# Patient Record
Sex: MALE | HISPANIC OR LATINO | Employment: FULL TIME | URBAN - METROPOLITAN AREA
[De-identification: names, ages, dates, MRNs, and addresses within clinical notes are randomized per-mention and may not be internally consistent; named-entity substitution may affect disease eponyms.]

---

## 2020-05-13 ENCOUNTER — OFFICE VISIT (OUTPATIENT)
Dept: FAMILY MEDICINE CLINIC | Facility: CLINIC | Age: 30
End: 2020-05-13
Payer: COMMERCIAL

## 2020-05-13 VITALS
BODY MASS INDEX: 37.05 KG/M2 | OXYGEN SATURATION: 98 % | TEMPERATURE: 97.8 F | WEIGHT: 217 LBS | HEART RATE: 71 BPM | RESPIRATION RATE: 16 BRPM | DIASTOLIC BLOOD PRESSURE: 78 MMHG | SYSTOLIC BLOOD PRESSURE: 124 MMHG | HEIGHT: 64 IN

## 2020-05-13 DIAGNOSIS — T17.310A CHOKING DUE TO PHLEGM IN LARYNX, INITIAL ENCOUNTER: Primary | ICD-10-CM

## 2020-05-13 DIAGNOSIS — F41.8 ANXIETY ABOUT HEALTH: ICD-10-CM

## 2020-05-13 PROCEDURE — 1036F TOBACCO NON-USER: CPT | Performed by: FAMILY MEDICINE

## 2020-05-13 PROCEDURE — 3008F BODY MASS INDEX DOCD: CPT | Performed by: FAMILY MEDICINE

## 2020-05-13 PROCEDURE — 99204 OFFICE O/P NEW MOD 45 MIN: CPT | Performed by: FAMILY MEDICINE

## 2023-03-11 ENCOUNTER — HOSPITAL ENCOUNTER (EMERGENCY)
Facility: HOSPITAL | Age: 33
Discharge: HOME | End: 2023-03-12
Attending: EMERGENCY MEDICINE | Admitting: EMERGENCY MEDICINE

## 2023-03-11 DIAGNOSIS — T23.209A: ICD-10-CM

## 2023-03-11 DIAGNOSIS — T23.229A: ICD-10-CM

## 2023-03-11 DIAGNOSIS — T23.201A PARTIAL THICKNESS BURN OF MULTIPLE SITES OF RIGHT HAND, INITIAL ENCOUNTER: Primary | ICD-10-CM

## 2023-03-11 RX ORDER — IBUPROFEN 600 MG/1
600 TABLET ORAL EVERY 6 HOURS PRN
Qty: 30 TABLET | Refills: 0 | Status: SHIPPED | OUTPATIENT
Start: 2023-03-11

## 2023-03-11 RX ORDER — GINSENG 100 MG
1 CAPSULE ORAL 2 TIMES DAILY
Qty: 28 G | Refills: 0 | Status: SHIPPED | OUTPATIENT
Start: 2023-03-11

## 2023-03-11 RX ORDER — GINSENG 100 MG
1 CAPSULE ORAL ONCE
Status: DISCONTINUED | OUTPATIENT
Start: 2023-03-11 | End: 2023-03-11

## 2023-03-11 RX ORDER — MORPHINE SULFATE 10 MG/ML
6 INJECTION, SOLUTION INTRAMUSCULAR; INTRAVENOUS ONCE
Status: COMPLETED | OUTPATIENT
Start: 2023-03-11 | End: 2023-03-11

## 2023-03-11 RX ORDER — MORPHINE SULFATE 4 MG/ML
4 INJECTION, SOLUTION INTRAMUSCULAR; INTRAVENOUS ONCE
Status: DISCONTINUED | OUTPATIENT
Start: 2023-03-11 | End: 2023-03-12

## 2023-03-11 RX ORDER — IBUPROFEN 600 MG/1
600 TABLET ORAL ONCE
Status: COMPLETED | OUTPATIENT
Start: 2023-03-11 | End: 2023-03-12

## 2023-03-11 RX ORDER — GINSENG 100 MG
1 CAPSULE ORAL ONCE
Status: COMPLETED | OUTPATIENT
Start: 2023-03-11 | End: 2023-03-11

## 2023-03-11 RX ORDER — OXYCODONE HYDROCHLORIDE AND ACETAMINOPHEN 5; 325 MG/1; MG/1
1 TABLET ORAL EVERY 6 HOURS PRN
Qty: 12 TABLET | Refills: 0 | Status: SHIPPED | OUTPATIENT
Start: 2023-03-11 | End: 2023-03-21

## 2023-03-11 RX ADMIN — BACITRACIN 1 LARGE APPLICATION: 500 OINTMENT TOPICAL at 23:40

## 2023-03-11 RX ADMIN — MORPHINE SULFATE 6 MG: 10 INJECTION INTRAVENOUS at 23:33

## 2023-03-11 RX ADMIN — MORPHINE SULFATE 6 MG: 10 INJECTION INTRAVENOUS at 22:29

## 2023-03-12 VITALS
WEIGHT: 217 LBS | RESPIRATION RATE: 20 BRPM | TEMPERATURE: 98.2 F | HEART RATE: 88 BPM | HEIGHT: 64 IN | BODY MASS INDEX: 37.05 KG/M2 | DIASTOLIC BLOOD PRESSURE: 89 MMHG | OXYGEN SATURATION: 99 % | SYSTOLIC BLOOD PRESSURE: 149 MMHG

## 2023-03-12 RX ORDER — MORPHINE SULFATE 4 MG/ML
4 INJECTION, SOLUTION INTRAMUSCULAR; INTRAVENOUS ONCE
Status: COMPLETED | OUTPATIENT
Start: 2023-03-12 | End: 2023-03-12

## 2023-03-12 RX ADMIN — IBUPROFEN 600 MG: 600 TABLET, FILM COATED ORAL at 00:09

## 2023-03-12 RX ADMIN — MORPHINE SULFATE 4 MG: 4 INJECTION INTRAVENOUS at 00:22
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
## 2023-06-06 ENCOUNTER — APPOINTMENT (EMERGENCY)
Dept: RADIOLOGY | Facility: HOSPITAL | Age: 33
End: 2023-06-06
Payer: COMMERCIAL

## 2023-06-06 ENCOUNTER — HOSPITAL ENCOUNTER (EMERGENCY)
Facility: HOSPITAL | Age: 33
Discharge: HOME | End: 2023-06-06
Attending: EMERGENCY MEDICINE | Admitting: EMERGENCY MEDICINE
Payer: COMMERCIAL

## 2023-06-06 VITALS
DIASTOLIC BLOOD PRESSURE: 89 MMHG | SYSTOLIC BLOOD PRESSURE: 134 MMHG | TEMPERATURE: 99.3 F | OXYGEN SATURATION: 100 % | HEART RATE: 82 BPM | RESPIRATION RATE: 18 BRPM

## 2023-06-06 DIAGNOSIS — S61.219A FINGER LACERATION: Primary | ICD-10-CM

## 2023-06-06 DIAGNOSIS — S62.639A CLOSED FRACTURE OF TUFT OF DISTAL PHALANX OF FINGER: ICD-10-CM

## 2023-06-06 PROCEDURE — 73130 X-RAY EXAM OF HAND: CPT

## 2023-06-06 RX ORDER — CEPHALEXIN 500 MG/1
500 CAPSULE ORAL EVERY 8 HOURS SCHEDULED
Qty: 21 CAPSULE | Refills: 0 | Status: SHIPPED | OUTPATIENT
Start: 2023-06-06 | End: 2023-06-13

## 2023-06-06 RX ORDER — ROPIVACAINE HYDROCHLORIDE 5 MG/ML
20 INJECTION, SOLUTION EPIDURAL; INFILTRATION; PERINEURAL ONCE
Status: COMPLETED | OUTPATIENT
Start: 2023-06-06 | End: 2023-06-06

## 2023-06-06 RX ORDER — GINSENG 100 MG
1 CAPSULE ORAL ONCE
Status: COMPLETED | OUTPATIENT
Start: 2023-06-06 | End: 2023-06-06

## 2023-06-06 RX ORDER — CEPHALEXIN 250 MG/1
500 CAPSULE ORAL ONCE
Status: COMPLETED | OUTPATIENT
Start: 2023-06-06 | End: 2023-06-06

## 2023-06-06 RX ORDER — GINSENG 100 MG
1 CAPSULE ORAL 2 TIMES DAILY
Qty: 28 G | Refills: 0 | Status: SHIPPED | OUTPATIENT
Start: 2023-06-06

## 2023-06-06 RX ADMIN — ROPIVACAINE HYDROCHLORIDE 20 ML: 5 INJECTION, SOLUTION EPIDURAL; INFILTRATION; PERINEURAL at 18:53

## 2023-06-06 RX ADMIN — CEPHALEXIN 500 MG: 250 CAPSULE ORAL at 20:47

## 2023-06-06 RX ADMIN — BACITRACIN 1 LARGE APPLICATION: 500 OINTMENT TOPICAL at 20:47

## 2023-06-06 NOTE — ED PROVIDER NOTES
History  Chief Complaint   Patient presents with   • Laceration     Pt presents w/ laceration to left hand in multiple digits, unable to visualize lacerations in triage d/t clots  Right-handed 60-year-old male up-to-date on immunizations including tetanus presents to the emergency department with laceration to the left hand  Patient was working with a reciprocating saw  The piece of wood that he was cutting when out of the way  Patient then across to his left index and long finger  Laceration is not through the joint  Patient is able to flex and extend his finger as normal though there is exposed muscle on the flexural side of the finger  Patient notes normal sensation throughout the left hand  History provided by:  Spouse   used: Yes    Finger Laceration  Location:  Finger  Finger laceration location:  L index finger and L middle finger  Depth: Through muscle  Quality: avulsion and straight    Bleeding: venous    Injury mechanism: Saw  Pain details:     Quality:  Aching    Severity:  Severe    Timing:  Constant    Progression:  Unchanged  Foreign body present:  Thao Odom by:  Nothing  Worsened by:  Nothing  Ineffective treatments:  None tried  Tetanus status:  Up to date  Associated symptoms: no numbness                Prior to Admission Medications   Prescriptions Last Dose Informant Patient Reported? Taking?   bacitracin topical ointment 500 units/g topical ointment   No No   Sig: Apply 1 large application topically 2 (two) times a day   ibuprofen (MOTRIN) 600 mg tablet   No No   Sig: Take 1 tablet (600 mg total) by mouth every 6 (six) hours as needed for moderate pain or mild pain      Facility-Administered Medications: None       History reviewed  No pertinent past medical history      Past Surgical History:   Procedure Laterality Date   • FACIAL LACERATIONS REPAIR     • HAND SURGERY  2015       Family History   Problem Relation Age of Onset   • Diabetes Mother • Diabetes Father      I have reviewed and agree with the history as documented  E-Cigarette/Vaping     E-Cigarette/Vaping Substances     Social History     Tobacco Use   • Smoking status: Former     Types: Cigarettes     Start date: 5/13/2011     Quit date: 5/13/2013     Years since quitting: 10 0   • Smokeless tobacco: Never   Substance Use Topics   • Alcohol use: Yes   • Drug use: Never       Review of Systems   Skin: Positive for wound  All other systems reviewed and are negative  Physical Exam  Physical Exam  Vitals and nursing note reviewed  Constitutional:       General: He is not in acute distress  Appearance: He is well-developed  He is not diaphoretic  HENT:      Head: Normocephalic and atraumatic  Right Ear: External ear normal       Left Ear: External ear normal    Eyes:      Conjunctiva/sclera: Conjunctivae normal    Neck:      Trachea: No tracheal deviation  Cardiovascular:      Rate and Rhythm: Normal rate and regular rhythm  Heart sounds: Normal heart sounds  No murmur heard  Pulmonary:      Effort: No respiratory distress  Breath sounds: Normal breath sounds  No stridor  No wheezing or rales  Abdominal:      General: Bowel sounds are normal  There is no distension  Palpations: Abdomen is soft  There is no mass  Tenderness: There is no abdominal tenderness  There is no guarding or rebound  Musculoskeletal:         General: Tenderness, deformity and signs of injury present  Comments: 2 lacerations on the left index finger  Total laceration length approximately 7 cm to the left index finger  Distal to the DIP  No involvement of the joint space  3 cm laceration on the long finger of the left hand on the flexural portion  Does not go into the joint  Patient is able to flex and extend his left index finger and long finger at all joints  He has normal sensation to light touch throughout the left hand  Skin:     General: Skin is dry  Findings: No rash  Neurological:      Motor: No abnormal muscle tone  Coordination: Coordination normal    Psychiatric:         Behavior: Behavior normal          Thought Content: Thought content normal          Judgment: Judgment normal          Vital Signs  ED Triage Vitals   Temperature Pulse Respirations Blood Pressure SpO2   06/06/23 1836 06/06/23 1835 06/06/23 1835 06/06/23 1836 06/06/23 1835   99 3 °F (37 4 °C) 82 18 134/89 100 %      Temp Source Heart Rate Source Patient Position - Orthostatic VS BP Location FiO2 (%)   06/06/23 1836 06/06/23 1835 06/06/23 1836 06/06/23 1836 --   Oral Monitor Lying Right arm       Pain Score       --                  Vitals:    06/06/23 1835 06/06/23 1836   BP:  134/89   Pulse: 82    Patient Position - Orthostatic VS:  Lying         Visual Acuity      ED Medications  Medications   ropivacaine (NAROPIN) 0 5 % injection 20 mL (20 mL Infiltration Given 6/6/23 1853)   bacitracin topical ointment 1 large application (1 large application Topical Given 6/6/23 2047)   cephalexin (KEFLEX) capsule 500 mg (500 mg Oral Given 6/6/23 2047)       Diagnostic Studies  Results Reviewed     None                 XR hand 3+ views LEFT   ED Interpretation by Huma Low DO (06/06 1937)   Distal index Tuft fracture                 Procedures  Laceration repair    Date/Time: 6/6/2023 8:04 PM    Performed by: Huma Low DO  Authorized by: Huma Low DO  Consent: The procedure was performed in an emergent situation  Consent given by: patient  Patient identity confirmed: verbally with patient  Location: left hand  Laceration length: 10 cm  Contamination: The wound is contaminated  Foreign bodies: wood  Tendon involvement: none  Nerve involvement: none  Vascular damage: yes  Anesthesia: digital block    Anesthesia:  Local anesthetic: ropivicaine 0 5    Anesthetic total: 8 mL    Sedation:  Patient sedated: no      Wound Dehiscence:  Superficial Wound Dehiscence: simple closure      Procedure Details:  Irrigation solution: saline  Irrigation method: syringe  Amount of cleaning: extensive  Debridement: moderate  Degree of undermining: minimal  Skin closure: 5-0 nylon  Number of sutures: 18 (15 left index, 3 left long finger)  Technique: simple  Approximation: close  Approximation difficulty: complex  Dressing: antibiotic ointment, splint and non-adhesive packing strip  Patient tolerance: patient tolerated the procedure well with no immediate complications  Comments: Placed in a splint to the left index finger by nursing  Cleaning details: Metaline             ED Course                               SBIRT 22yo+    Flowsheet Row Most Recent Value   Initial Alcohol Screen: US AUDIT-C     1  How often do you have a drink containing alcohol? 0 Filed at: 06/06/2023 1836   2  How many drinks containing alcohol do you have on a typical day you are drinking? 0 Filed at: 06/06/2023 1836   3a  Male UNDER 65: How often do you have five or more drinks on one occasion? 0 Filed at: 06/06/2023 1836   Audit-C Score 0 Filed at: 06/06/2023 1836   MARK: How many times in the past year have you    Used an illegal drug or used a prescription medication for non-medical reasons? Never Filed at: 06/06/2023 1836                    Medical Decision Making  Patient with laceration to the left index finger and long finger  Copiously irrigated with normal saline  Small amounts of wood pulled from the wound  Laceration was closed with 15 sutures to the left index finger and 3 to the left long finger  Performed evaluate for fracture  Patient has a distal phalanx fracture  Complex laceration  Recommend follow-up with hand surgery  Strict return precautions discussed for signs of infection which I went over with the patient and wife  We will start on cephalexin      Closed fracture of tuft of distal phalanx of finger: acute illness or injury  Finger laceration: acute illness or injury  Amount and/or Complexity of Data Reviewed  Radiology: ordered and independent interpretation performed  Risk  OTC drugs  Prescription drug management  Disposition  Final diagnoses:   Finger laceration   Closed fracture of tuft of distal phalanx of finger     Time reflects when diagnosis was documented in both MDM as applicable and the Disposition within this note     Time User Action Codes Description Comment    6/6/2023  8:03 PM Kendrick Sims Add [E34 045B] Finger laceration     6/6/2023  8:26 PM Balwinder Ibarra Add [S60 049A] Closed fracture of tuft of distal phalanx of finger       ED Disposition     ED Disposition   Discharge    Condition   Stable    Date/Time   Tue Jun 6, 2023  8:26 PM    Comment   Branden Chairez discharge to home/self care                 Follow-up Information     Follow up With Specialties Details Why Contact Info Additional Information    Luis Tanner MD Orthopedic Surgery, Hand Surgery Schedule an appointment as soon as possible for a visit  For re-evaluation as soon as possible [address] [facility] Emergency Department Emergency Medicine  If symptoms worsen [address] [facility],[address] [id number]  [address] [place] [address] Emergency Department, [address], [address] [place] [address] Rd          Discharge Medication List as of 6/6/2023  8:29 PM      START taking these medications    Details   cephalexin (KEFLEX) 500 mg capsule Take 1 capsule (500 mg total) by mouth every 8 (eight) hours for 7 days, Starting Tue 6/6/2023, Until Tue 6/13/2023, Normal         CONTINUE these medications which have CHANGED    Details   bacitracin topical ointment 500 units/g topical ointment Apply 1 large application topically 2 (two) times a day, Starting Tue 6/6/2023, Normal         CONTINUE these medications which have NOT CHANGED    Details   ibuprofen (MOTRIN) 600 mg tablet Take 1 tablet (600 mg total) by mouth every 6 (six) hours as needed for moderate pain or mild pain, Starting Sat 3/11/2023, Normal             No discharge procedures on file      PDMP Review     None          ED Provider  Electronically Signed by           Alka Beaver,   06/06/23 Elsi [id number],   06/06/23 6095

## 2023-06-06 NOTE — ED NOTES
Provider at bedside     [facility], [address] [place] [address]  06/06/23 9405

## 2023-06-07 NOTE — TELEPHONE ENCOUNTER
Caller:  Wife    Doctor: Hanna Hudson     Reason for call: Calling to scheduled ED f/u for Closed fracture of tuft of distal phalanx of finger      Call back#: [phone]

## 2023-06-08 VITALS — SYSTOLIC BLOOD PRESSURE: 108 MMHG | DIASTOLIC BLOOD PRESSURE: 60 MMHG | HEART RATE: 70 BPM

## 2023-06-08 DIAGNOSIS — S62.639A CLOSED FRACTURE OF TUFT OF DISTAL PHALANX OF FINGER: Primary | ICD-10-CM

## 2023-06-08 DIAGNOSIS — S61.213A LACERATION OF LEFT MIDDLE FINGER WITHOUT FOREIGN BODY WITHOUT DAMAGE TO NAIL, INITIAL ENCOUNTER: ICD-10-CM

## 2023-06-08 DIAGNOSIS — S61.211A LACERATION OF LEFT INDEX FINGER WITHOUT FOREIGN BODY, NAIL DAMAGE STATUS UNSPECIFIED, INITIAL ENCOUNTER: ICD-10-CM

## 2023-06-08 PROCEDURE — 99204 OFFICE O/P NEW MOD 45 MIN: CPT | Performed by: SURGERY

## 2023-06-08 RX ORDER — SENNOSIDES 8.6 MG
650 CAPSULE ORAL EVERY 8 HOURS PRN
Qty: 30 TABLET | Refills: 0 | Status: SHIPPED | OUTPATIENT
Start: 2023-06-08

## 2023-06-08 RX ORDER — IBUPROFEN 600 MG/1
TABLET ORAL EVERY 6 HOURS PRN
Qty: 30 TABLET | Refills: 0 | Status: CANCELLED | OUTPATIENT
Start: 2023-06-08

## 2023-06-08 RX ORDER — IBUPROFEN 600 MG/1
600 TABLET ORAL EVERY 6 HOURS PRN
Qty: 30 TABLET | Refills: 0 | Status: SHIPPED | OUTPATIENT
Start: 2023-06-08

## 2023-06-08 NOTE — PROGRESS NOTES
Red STUART  Attending, Orthopaedic Surgery  Hand, Wrist, and Elbow Surgery  [facility]      ORTHOPAEDIC HAND, WRIST, AND ELBOW OFFICE  VISIT       ASSESSMENT/PLAN:      28 y o  male with left index finger tuft fracture, left index and long finger lacerations due to saw injury, DOI 6/6/23    X-rays were reviewed  The etiology of above diagnosis was discussed along with treatment options  It is likely his fracture will not heal bone to bone but should scar in an be asymptomatic  He will start dressing changes 2x a day  The dressings will consist of adaptic, finger socks and co-ban  He may leave the wounds open to air to allow wounds to dry out prior to new dressing application  He may wash his hands normally and perform normal hand hygiene  He was advised to avoid standing water  He was also advised to avoid the use of ointment over the wounds  Continue oral ABX as prescribed  He will start to work on finger flexion at home  650 MG of Tylenol and 600 MG of Ibuprofen was prescribed and sent to his pharmacy electronically, to be taken up to 3x a day for pain control  Follow up in 7-8 days for suture removal      Plan of care was provided in S     [employer] interpretor [id number] was used throughout the entirety of this visit     The patient verbalized understanding of exam findings and treatment plan  We engaged in the shared decision-making process and treatment options were discussed at length with the patient  Surgical and conservative management discussed today along with risks and benefits  Diagnoses and all orders for this visit:    Closed fracture of tuft of distal phalanx of finger    Laceration of left index finger without foreign body, nail damage status unspecified, initial encounter    Laceration of left middle finger without foreign body without damage to nail, initial encounter      Follow Up:  Return in about 1 week (around 6/15/2023)      To Do Next Visit:  Re-evaluation of current issue and Sutures out    ____________________________________________________________________________________________________________________________________________      CHIEF COMPLAINT:  Chief Complaint   Patient presents with   • Left Hand - Pain     Finger laceration on left pointer and long finger  SUBJECTIVE:  Napoleon Parham is a 28y o  year old RHD male who presents to the office for evaluation of his left hand  He states on 6/6/23 he injured his left index and long finger on a saw when cutting wood at home  He presented to the ED after injury, at which time x-rays were performed, his lacerations were cleaned and sutured  He was placed on oral ABX at that time  He was also placed into an aluminum finger splint for the index finger  He is taking Keflex as prescribed  He notes pain mainly to his index finger  He does note numbness to his index and long fingers, more so to the index finger  He underwent a Tetanus shot 7 years ago  Pain/symptom timing:  Worse during the day when active  Pain/symptom context:  Worse with activites and work  Pain/symptom modifying factors:  Rest makes better, activities make worse  Pain/symptom associated signs/symptoms: none    Prior treatment   · NSAIDsYes   · Injections No   · Bracing/Orthotics Yes    Physical Therapy No     I have personally reviewed all the relevant PMH, PSH, SH, FH, Medications and allergies      PAST MEDICAL HISTORY:  History reviewed  No pertinent past medical history      PAST SURGICAL HISTORY:  Past Surgical History:   Procedure Laterality Date   • FACIAL LACERATIONS REPAIR     • HAND SURGERY  2015       FAMILY HISTORY:  Family History   Problem Relation Age of Onset   • Diabetes Mother    • Diabetes Father        SOCIAL HISTORY:  Social History     Tobacco Use   • Smoking status: Former     Types: Cigarettes     Start date: 5/13/2011     Quit date: 5/13/2013     Years since quitting: 10 0   • Smokeless tobacco: Never   Substance "Use Topics   • Alcohol use: Yes   • Drug use: Never       MEDICATIONS:    Current Outpatient Medications:   •  bacitracin topical ointment 500 units/g topical ointment, Apply 1 large application topically 2 (two) times a day, Disp: 28 g, Rfl: 0  •  cephalexin (KEFLEX) 500 mg capsule, Take 1 capsule (500 mg total) by mouth every 8 (eight) hours for 7 days, Disp: 21 capsule, Rfl: 0  •  ibuprofen (MOTRIN) 600 mg tablet, Take 1 tablet (600 mg total) by mouth every 6 (six) hours as needed for moderate pain or mild pain, Disp: 30 tablet, Rfl: 0    ALLERGIES:  No Known Allergies        REVIEW OF SYSTEMS:  Review of Systems   Constitutional: Negative for chills, fever and unexpected weight change  HENT: Negative for hearing loss, nosebleeds and sore throat  Eyes: Negative for pain, redness and visual disturbance  Respiratory: Negative for cough, shortness of breath and wheezing  Cardiovascular: Negative for chest pain, palpitations and leg swelling  Gastrointestinal: Negative for abdominal pain, nausea and vomiting  Endocrine: Negative for polydipsia and polyuria  Genitourinary: Negative for difficulty urinating and hematuria  Musculoskeletal: Negative for arthralgias, joint swelling and myalgias  Skin: Positive for wound  Negative for rash  Neurological: Negative for dizziness, numbness and headaches  Psychiatric/Behavioral: Negative for decreased concentration, dysphoric mood and suicidal ideas  The patient is not nervous/anxious          VITALS:  Vitals:    06/08/23 1030   BP: 108/60   Pulse: 70       LABS:  HgA1c: No results found for: \"HGBA1C\"  BMP: No results found for: \"BUN\", \"CALCIUM\", \"CL\", \"CO2\", \"CREATININE\", \"GLUCOSE\", \"K\", \"NA\"    _____________________________________________________  PHYSICAL EXAMINATION:  General: well developed and well nourished, alert, oriented times 3 and appears comfortable  Psychiatric: Normal  HEENT: Normocephalic, Atraumatic Trachea Midline, No " torticollis  Pulmonary: No audible wheezing or respiratory distress   Abdomen/GI: Non tender, non distended   Cardiovascular: No pitting edema, 2+ radial pulse   Skin: No Masses, No Erythema, No Fluctuation, No Ulcerations  Neurovascular: Sensation Intact to the Median, Ulnar, Radial Nerve, Motor Intact to the Median, Ulnar, Radial Nerve and Pulses Intact  Musculoskeletal: Normal, except as noted in detailed exam and in HPI  MUSCULOSKELETAL EXAMINATION:    Left index and long fingers:      Index finger laceration distal to the DIP joint, sutures intact, slight maceration noted   Long finger volar laceration of the distal phalanx, sutures intact, maceration noted   Able to flex and extend at the DIP, PIP and MCP joints     ___________________________________________________  STUDIES REVIEWED:  I have personally reviewed AP lateral and oblique radiographs of left hand  which demonstrate index finger tuft fx            PROCEDURES PERFORMED:  Procedures  No Procedures performed today    _____________________________________________________      Thania Morales    I,:  Peewee Morin am acting as a scribe while in the presence of the attending physician :       I,:  Yesenia Smith MD personally performed the services described in this documentation    as scribed in my presence :

## 2023-06-08 NOTE — PATIENT INSTRUCTIONS
Dressing changes 2x a day, adaptic, finger socks and co-ban    May shower normally and let water run over the fingers, no standing water, such as baths, pools hot tubs and dirty dish water   May work on bending the fingers at home   No ointment over the lacerations   Follow up in 1 weeks time for suture removal   650 Tylenol and 600 Mg Ibuprofen up to 3x a day, sent to pharmacy on file

## 2023-06-15 ENCOUNTER — TELEPHONE (OUTPATIENT)
Dept: OBGYN CLINIC | Facility: CLINIC | Age: 33
End: 2023-06-15

## 2023-06-15 VITALS — HEART RATE: 91 BPM | SYSTOLIC BLOOD PRESSURE: 123 MMHG | DIASTOLIC BLOOD PRESSURE: 81 MMHG

## 2023-06-15 DIAGNOSIS — S61.213A LACERATION OF LEFT MIDDLE FINGER WITHOUT FOREIGN BODY WITHOUT DAMAGE TO NAIL, INITIAL ENCOUNTER: ICD-10-CM

## 2023-06-15 DIAGNOSIS — S62.639A CLOSED FRACTURE OF TUFT OF DISTAL PHALANX OF FINGER: Primary | ICD-10-CM

## 2023-06-15 DIAGNOSIS — S61.211A LACERATION OF LEFT INDEX FINGER WITHOUT FOREIGN BODY, NAIL DAMAGE STATUS UNSPECIFIED, INITIAL ENCOUNTER: ICD-10-CM

## 2023-06-15 PROCEDURE — 99214 OFFICE O/P EST MOD 30 MIN: CPT | Performed by: SURGERY

## 2023-06-15 NOTE — PROGRESS NOTES
ASSESSMENT/PLAN:      28 y o  male with left index finger tuft fracture, left index and long finger lacerations due to saw injury, DOI 6/6/23  Patient was instructed to work on ROM exercises  Patient may return to work 5/88/63 with use of silicon finger sleeve  The patient verbalized understanding of exam findings and treatment plan  We engaged in the shared decision-making process and treatment options were discussed at length with the patient  Surgical and conservative management discussed today along with risks and benefits  Diagnoses and all orders for this visit:    Closed fracture of tuft of distal phalanx of finger    Laceration of left index finger without foreign body, nail damage status unspecified, initial encounter    Laceration of left middle finger without foreign body without damage to nail, initial encounter          Follow Up:  Return in about 2 weeks (around 6/29/2023)  To Do Next Visit:  Re-evaluation of current issue    ____________________________________________________________________________________________________________________________________________      CHIEF COMPLAINT:  Chief Complaint   Patient presents with   • Left Hand - Follow-up     Closed fracture of tuft of distal phalanx of finger        SUBJECTIVE:  Santiago Murphy is a 28y o  year old RHD male who presents  left index finger tuft fracture, left index and long finger lacerations due to saw injury, DOI 6/6/23  Patient states overall he is doing well  He report mild pain if he bumps the finger  I have personally reviewed all the relevant PMH, PSH, SH, FH, Medications and allergies  PAST MEDICAL HISTORY:  History reviewed  No pertinent past medical history      PAST SURGICAL HISTORY:  Past Surgical History:   Procedure Laterality Date   • FACIAL LACERATIONS REPAIR     • HAND SURGERY  2015       FAMILY HISTORY:  Family History   Problem Relation Age of Onset   • Diabetes Mother    • Diabetes Father "      SOCIAL HISTORY:  Social History     Tobacco Use   • Smoking status: Former     Types: Cigarettes     Start date: 5/13/2011     Quit date: 5/13/2013     Years since quitting: 10 0   • Smokeless tobacco: Never   Substance Use Topics   • Alcohol use: Yes   • Drug use: Never       MEDICATIONS:    Current Outpatient Medications:   •  acetaminophen (TYLENOL) 650 mg CR tablet, Take 1 tablet (650 mg total) by mouth every 8 (eight) hours as needed for mild pain, Disp: 30 tablet, Rfl: 0  •  bacitracin topical ointment 500 units/g topical ointment, Apply 1 large application topically 2 (two) times a day, Disp: 28 g, Rfl: 0  •  ibuprofen (MOTRIN) 600 mg tablet, Take 1 tablet (600 mg total) by mouth every 6 (six) hours as needed for moderate pain or mild pain, Disp: 30 tablet, Rfl: 0    ALLERGIES:  No Known Allergies    REVIEW OF SYSTEMS:  Review of Systems   Constitutional: Negative for chills and fever  HENT: Negative for drooling and hearing loss  Eyes: Negative for visual disturbance  Respiratory: Negative for cough and shortness of breath  Cardiovascular: Negative for chest pain  Gastrointestinal: Negative for abdominal pain  Skin: Negative for rash  Psychiatric/Behavioral: Negative for agitation         VITALS:  Vitals:    06/15/23 1022   BP: 123/81   Pulse: 91       LABS:  HgA1c: No results found for: \"HGBA1C\"  BMP: No results found for: \"BUN\", \"CALCIUM\", \"CL\", \"CO2\", \"CREATININE\", \"GLUCOSE\", \"K\", \"NA\"    _____________________________________________________  PHYSICAL EXAMINATION:  General: well developed and well nourished, alert, oriented times 3 and appears comfortable  Psychiatric: Normal  HEENT: Normocephalic, Atraumatic Trachea Midline, No torticollis  Pulmonary: No audible wheezing or respiratory distress   Cardiovascular: No pitting edema, 2+ radial pulse   Abdominal/GI: abdomen non tender, non distended   Skin: No masses, erythema, lacerations, fluctation, ulcerations  Neurovascular: " Sensation Intact to the Median, Ulnar, Radial Nerve, Motor Intact to the Median, Ulnar, Radial Nerve and Pulses Intact  Musculoskeletal: Normal, except as noted in detailed exam and in HPI  MUSCULOSKELETAL EXAMINATION:  Index finger laceration distal to the DIP joint, sutures intact, laceration is well approximated     Long finger volar laceration of the distal phalanx, sutures intact  Able to flex and extend at the DIP, PIP and MCP joints        ___________________________________________________  STUDIES REVIEWED:  No new studies          PROCEDURES PERFORMED:  Procedures  No Procedures performed today    _____________________________________________________      Ankit Light    I,:  Ya Lopez am acting as a scribe while in the presence of the attending physician :       I,:  Clinton Rosario MD personally performed the services described in this documentation    as scribed in my presence :

## 2023-06-15 NOTE — TELEPHONE ENCOUNTER
Dr Dietz Conception would like patient to follow up in 2 weeks      F/U Closed fracture of tuft of distal phalanx of finger

## 2023-07-03 ENCOUNTER — TELEPHONE (OUTPATIENT)
Dept: FAMILY MEDICINE CLINIC | Facility: CLINIC | Age: 33
End: 2023-07-03

## 2024-03-06 ENCOUNTER — TELEPHONE (OUTPATIENT)
Dept: FAMILY MEDICINE CLINIC | Facility: CLINIC | Age: 34
End: 2024-03-06